# Patient Record
Sex: FEMALE | Race: OTHER | Employment: FULL TIME | URBAN - METROPOLITAN AREA
[De-identification: names, ages, dates, MRNs, and addresses within clinical notes are randomized per-mention and may not be internally consistent; named-entity substitution may affect disease eponyms.]

---

## 2022-05-11 PROCEDURE — 96374 THER/PROPH/DIAG INJ IV PUSH: CPT

## 2022-05-11 PROCEDURE — 96375 TX/PRO/DX INJ NEW DRUG ADDON: CPT

## 2022-05-11 PROCEDURE — 99284 EMERGENCY DEPT VISIT MOD MDM: CPT

## 2022-05-11 PROCEDURE — 96361 HYDRATE IV INFUSION ADD-ON: CPT

## 2022-05-12 ENCOUNTER — APPOINTMENT (OUTPATIENT)
Dept: CT IMAGING | Facility: HOSPITAL | Age: 52
End: 2022-05-12
Attending: NURSE PRACTITIONER
Payer: OTHER MISCELLANEOUS

## 2022-05-12 ENCOUNTER — HOSPITAL ENCOUNTER (EMERGENCY)
Facility: HOSPITAL | Age: 52
Discharge: HOME | End: 2022-05-12
Payer: OTHER MISCELLANEOUS

## 2022-05-12 VITALS
WEIGHT: 142 LBS | DIASTOLIC BLOOD PRESSURE: 79 MMHG | TEMPERATURE: 98 F | HEART RATE: 76 BPM | RESPIRATION RATE: 16 BRPM | BODY MASS INDEX: 25.16 KG/M2 | SYSTOLIC BLOOD PRESSURE: 120 MMHG | OXYGEN SATURATION: 99 % | HEIGHT: 63 IN

## 2022-05-12 DIAGNOSIS — S09.90XA CLOSED HEAD INJURY, INITIAL ENCOUNTER: Primary | ICD-10-CM

## 2022-05-12 LAB
ANION GAP SERPL CALC-SCNC: 5 MMOL/L (ref 0–18)
BASOPHILS # BLD AUTO: 0.05 X10(3) UL (ref 0–0.2)
BASOPHILS NFR BLD AUTO: 0.6 %
BUN BLD-MCNC: 13 MG/DL (ref 7–18)
BUN/CREAT SERPL: 24.1 (ref 10–20)
CALCIUM BLD-MCNC: 9.1 MG/DL (ref 8.5–10.1)
CHLORIDE SERPL-SCNC: 110 MMOL/L (ref 98–112)
CO2 SERPL-SCNC: 27 MMOL/L (ref 21–32)
CREAT BLD-MCNC: 0.54 MG/DL
DEPRECATED RDW RBC AUTO: 40.7 FL (ref 35.1–46.3)
EOSINOPHIL # BLD AUTO: 0.35 X10(3) UL (ref 0–0.7)
EOSINOPHIL NFR BLD AUTO: 4.3 %
ERYTHROCYTE [DISTWIDTH] IN BLOOD BY AUTOMATED COUNT: 12 % (ref 11–15)
GLUCOSE BLD-MCNC: 115 MG/DL (ref 70–99)
HCT VFR BLD AUTO: 40.7 %
HGB BLD-MCNC: 13.8 G/DL
IMM GRANULOCYTES # BLD AUTO: 0.01 X10(3) UL (ref 0–1)
IMM GRANULOCYTES NFR BLD: 0.1 %
LYMPHOCYTES # BLD AUTO: 3.28 X10(3) UL (ref 1–4)
LYMPHOCYTES NFR BLD AUTO: 40.2 %
MCH RBC QN AUTO: 31.2 PG (ref 26–34)
MCHC RBC AUTO-ENTMCNC: 33.9 G/DL (ref 31–37)
MCV RBC AUTO: 91.9 FL
MONOCYTES # BLD AUTO: 0.53 X10(3) UL (ref 0.1–1)
MONOCYTES NFR BLD AUTO: 6.5 %
NEUTROPHILS # BLD AUTO: 3.94 X10 (3) UL (ref 1.5–7.7)
NEUTROPHILS # BLD AUTO: 3.94 X10(3) UL (ref 1.5–7.7)
NEUTROPHILS NFR BLD AUTO: 48.3 %
OSMOLALITY SERPL CALC.SUM OF ELEC: 295 MOSM/KG (ref 275–295)
PLATELET # BLD AUTO: 297 10(3)UL (ref 150–450)
POTASSIUM SERPL-SCNC: 4.1 MMOL/L (ref 3.5–5.1)
RBC # BLD AUTO: 4.43 X10(6)UL
SODIUM SERPL-SCNC: 142 MMOL/L (ref 136–145)
WBC # BLD AUTO: 8.2 X10(3) UL (ref 4–11)

## 2022-05-12 PROCEDURE — 80048 BASIC METABOLIC PNL TOTAL CA: CPT | Performed by: NURSE PRACTITIONER

## 2022-05-12 PROCEDURE — 70450 CT HEAD/BRAIN W/O DYE: CPT | Performed by: NURSE PRACTITIONER

## 2022-05-12 PROCEDURE — 85025 COMPLETE CBC W/AUTO DIFF WBC: CPT | Performed by: NURSE PRACTITIONER

## 2022-05-12 RX ORDER — METOCLOPRAMIDE HYDROCHLORIDE 5 MG/ML
10 INJECTION INTRAMUSCULAR; INTRAVENOUS ONCE
Status: COMPLETED | OUTPATIENT
Start: 2022-05-12 | End: 2022-05-12

## 2022-05-12 RX ORDER — KETOROLAC TROMETHAMINE 15 MG/ML
15 INJECTION, SOLUTION INTRAMUSCULAR; INTRAVENOUS ONCE
Status: COMPLETED | OUTPATIENT
Start: 2022-05-12 | End: 2022-05-12

## 2022-05-12 NOTE — ED INITIAL ASSESSMENT (HPI)
Patient to ER from home with c/o headache, nausea. Patient had head injury on 5/3/22 and has had CT since.  Patient told to go to ER by PMD.

## 2022-05-19 ENCOUNTER — LAB ENCOUNTER (OUTPATIENT)
Dept: LAB | Facility: HOSPITAL | Age: 52
End: 2022-05-19
Attending: Other
Payer: OTHER MISCELLANEOUS

## 2022-05-19 DIAGNOSIS — R41.3 MEMORY LOSS: ICD-10-CM

## 2022-05-19 LAB — TSI SER-ACNC: 2.09 MIU/ML (ref 0.36–3.74)

## 2022-05-19 PROCEDURE — 36415 COLL VENOUS BLD VENIPUNCTURE: CPT | Performed by: OTHER

## 2022-05-19 PROCEDURE — 86780 TREPONEMA PALLIDUM: CPT

## 2022-05-19 PROCEDURE — 82607 VITAMIN B-12: CPT | Performed by: OTHER

## 2022-05-19 PROCEDURE — 84443 ASSAY THYROID STIM HORMONE: CPT

## 2022-05-19 PROCEDURE — 82746 ASSAY OF FOLIC ACID SERUM: CPT | Performed by: OTHER

## 2022-05-20 ENCOUNTER — TELEPHONE (OUTPATIENT)
Dept: NEUROLOGY | Facility: CLINIC | Age: 52
End: 2022-05-20

## 2022-05-20 LAB — T PALLIDUM AB SER QL: NEGATIVE

## 2022-05-31 ENCOUNTER — TELEPHONE (OUTPATIENT)
Dept: NEUROLOGY | Facility: CLINIC | Age: 52
End: 2022-05-31

## 2022-05-31 NOTE — TELEPHONE ENCOUNTER
Received call from patient friend, Hunter Crocker (HIPPA verified). Patient speaks Georgian, so friend called. Hunter Crocker states patient seen by Dr Jinny Phalen after head injury & prescribed Amitriptyline 10mg oral tab Start with 1 pill QHS, for 1 week, then 2 pills qhs foor another week, then 3 pills at bedtime    Hunter Contehter states after 2nd day of medication, pt began to feel very sleepy but kept taking medication & tapered up as ordered. Hunter Contehter states patient is on day 4 of taking 2 pills and is extremely tired & had to call off work today because she was so tired. Advised drowsiness side effect. Hunter Crocker asking if patient needs to stay on med or if med can be stopped or tapered.

## 2022-05-31 NOTE — TELEPHONE ENCOUNTER
Spoke to Pancho and informed of message below. She will do this for a week and reassess at that time and then give us a call with an update.

## 2022-06-23 ENCOUNTER — TELEPHONE (OUTPATIENT)
Dept: PHYSICAL MEDICINE AND REHAB | Facility: CLINIC | Age: 52
End: 2022-06-23

## 2022-06-23 ENCOUNTER — TELEPHONE (OUTPATIENT)
Dept: NEUROLOGY | Facility: CLINIC | Age: 52
End: 2022-06-23

## 2022-06-23 NOTE — TELEPHONE ENCOUNTER
Called [facility] in [facility] to request report for physical therapy.   -Physical therapist who takes care of faxes is out of the office today. Will fax report tomorrow 6/24/22. Will await and if not will follow-up. Fax number provided. Called [facility] requesting MRI Report.   -Left message with patients information and call back number. Will await for a call back if not will try again later.        [facility] medical [facility] through work[facility]   [facility]   [phone]

## 2022-06-23 NOTE — TELEPHONE ENCOUNTER
Spoke to Basil at [place] who Approved 12 visits of ST at any facility.   Patient notified via [facility] [employer]

## 2022-06-23 NOTE — TELEPHONE ENCOUNTER
Workers Comp Claim #[id number] adjustor Josafat Qureshi  DOI 5/3/22    LM with Irineo Parker at [place] to initiate authorization for Speech Therapy [facility] [id number]     Orders/clinicals faxed to [phone]  Status: pending determination

## 2022-06-29 ENCOUNTER — TELEPHONE (OUTPATIENT)
Dept: PHYSICAL THERAPY | Facility: HOSPITAL | Age: 52
End: 2022-06-29

## (undated) NOTE — LETTER
Date & Time: 5/12/2022, 2:55 AM  Patient: Aretha Mohamud  Encounter Provider(s):    MILADIS Mckeon       To Whom It May Concern: Aretha Mohamud was seen and treated in our department on 5/11/2022. She should not return to work until 05/16/2022.     If you have any questions or concerns, please do not hesitate to call.        _____________________________  Physician/AP[facility] Signature